# Patient Record
Sex: MALE | Race: WHITE | Employment: FULL TIME | URBAN - METROPOLITAN AREA
[De-identification: names, ages, dates, MRNs, and addresses within clinical notes are randomized per-mention and may not be internally consistent; named-entity substitution may affect disease eponyms.]

---

## 2023-06-20 ENCOUNTER — OFFICE VISIT (OUTPATIENT)
Dept: FAMILY MEDICINE CLINIC | Age: 53
End: 2023-06-20
Payer: COMMERCIAL

## 2023-06-20 VITALS
RESPIRATION RATE: 16 BRPM | WEIGHT: 195 LBS | SYSTOLIC BLOOD PRESSURE: 124 MMHG | DIASTOLIC BLOOD PRESSURE: 76 MMHG | BODY MASS INDEX: 27.92 KG/M2 | HEIGHT: 70 IN | OXYGEN SATURATION: 97 % | HEART RATE: 64 BPM

## 2023-06-20 DIAGNOSIS — Z12.11 SCREENING FOR COLON CANCER: ICD-10-CM

## 2023-06-20 DIAGNOSIS — R19.09 LUMP IN THE GROIN: ICD-10-CM

## 2023-06-20 DIAGNOSIS — E78.00 HYPERCHOLESTEROLEMIA: ICD-10-CM

## 2023-06-20 DIAGNOSIS — Z00.00 PHYSICAL EXAM: Primary | ICD-10-CM

## 2023-06-20 DIAGNOSIS — Z76.89 ENCOUNTER TO ESTABLISH CARE: ICD-10-CM

## 2023-06-20 PROCEDURE — 99386 PREV VISIT NEW AGE 40-64: CPT | Performed by: REGISTERED NURSE

## 2023-06-20 ASSESSMENT — PATIENT HEALTH QUESTIONNAIRE - PHQ9
SUM OF ALL RESPONSES TO PHQ QUESTIONS 1-9: 0
1. LITTLE INTEREST OR PLEASURE IN DOING THINGS: 0
2. FEELING DOWN, DEPRESSED OR HOPELESS: 0
SUM OF ALL RESPONSES TO PHQ QUESTIONS 1-9: 0
SUM OF ALL RESPONSES TO PHQ9 QUESTIONS 1 & 2: 0

## 2023-06-20 ASSESSMENT — ENCOUNTER SYMPTOMS
SHORTNESS OF BREATH: 0
EYES NEGATIVE: 1
BACK PAIN: 0
GASTROINTESTINAL NEGATIVE: 1

## 2023-06-20 NOTE — PROGRESS NOTES
Patient: Nick Bowman is a 50 y.o. male who presents today with the following Chief Complaint(s):  Chief Complaint   Patient presents with    New Patient    Groin Swelling     Bump in Left groin area       Assessment/Plan:    1. Physical exam  Continue healthy nutrition and regular exercise. Obtain copies of labs completed in January- will review results and make any recommendations for follow up. 2. Hypercholesterolemia  See above. 3. Screening for colon cancer  Schedule colonoscopy. - [facility]    4. Lump in the groin  Area in left groin in likely an inguinal hernia or possibly a lipoma. Schedule ultrasound for further evaluation.   - US PELVIS LIMITED; Future    5. Encounter to establish care     Patient's past medical history, surgical history, family history, medications,  and allergies  were all reviewed and updated as appropriate today. Return in about 1 year (around 6/20/2024), or if symptoms worsen or fail to improve. HPI:     He is here to establish care. He recently lost 65lbs. He has a history of high cholesterol. He had blood work drawn through his insurance company in January 2023. He has changed his nutrition and increased exercise to improve his blood pressure and cholesterol. Since he lost weight he noticed a small bump in his left groin that comes and goes. No pain. No current outpatient medications on file. No current facility-administered medications for this visit. Review of Systems   Constitutional: Negative. HENT: Negative. Eyes: Negative. Respiratory:  Negative for shortness of breath. Cardiovascular:  Negative for chest pain, palpitations and leg swelling. Gastrointestinal: Negative. Endocrine: Negative. Genitourinary: Negative. Negative for difficulty urinating, penile swelling and scrotal swelling.         \"Bump in left groin\"   Musculoskeletal:  Negative for arthralgias, back

## 2023-06-22 ENCOUNTER — TELEPHONE (OUTPATIENT)
Dept: FAMILY MEDICINE CLINIC | Age: 53
End: 2023-06-22

## 2023-06-22 DIAGNOSIS — R19.09 LUMP IN THE GROIN: Primary | ICD-10-CM

## 2023-06-22 NOTE — TELEPHONE ENCOUNTER
[address] [place] [address] Scheduling stating the 6/20/23 Ultrasound needs changed because [address] [place] [address] does not do Male Pelvis Ultrasounds. Possible Example can be:  Soft Tissue Non Extremity     Contact name: Mohamud Stager [phone]

## 2023-06-24 ENCOUNTER — HOSPITAL ENCOUNTER (OUTPATIENT)
Dept: ULTRASOUND IMAGING | Age: 53
Discharge: HOME | End: 2023-06-24
Payer: COMMERCIAL

## 2023-06-24 DIAGNOSIS — R19.09 LUMP IN THE GROIN: ICD-10-CM

## 2023-06-24 PROCEDURE — 76999 ECHO EXAMINATION PROCEDURE: CPT

## 2023-06-27 DIAGNOSIS — K40.91 UNILATERAL RECURRENT INGUINAL HERNIA WITHOUT OBSTRUCTION OR GANGRENE: Primary | ICD-10-CM

## 2023-07-19 ENCOUNTER — INITIAL CONSULT (OUTPATIENT)
Dept: SURGERY | Age: 53
End: 2023-07-19
Payer: COMMERCIAL

## 2023-07-19 VITALS
HEART RATE: 64 BPM | BODY MASS INDEX: 28.2 KG/M2 | HEIGHT: 70 IN | WEIGHT: 197 LBS | DIASTOLIC BLOOD PRESSURE: 78 MMHG | SYSTOLIC BLOOD PRESSURE: 130 MMHG

## 2023-07-19 DIAGNOSIS — K40.90 LEFT INGUINAL HERNIA: Primary | ICD-10-CM

## 2023-07-19 PROCEDURE — 99244 OFF/OP CNSLTJ NEW/EST MOD 40: CPT | Performed by: SURGERY

## 2023-07-21 NOTE — PROGRESS NOTES
Department of General Surgery Consult    PATIENT NAME: Delon Lacy   YOB: 1970    ADMISSION DATE: No admission date for patient encounter. TODAY'S DATE: 7/19/23    Reason for Consult:  left inguinal hernia    Chief Complaint: bulge    Historian: patient    Requesting Physician:  Prabhjot Patino    HISTORY OF PRESENT ILLNESS:              The patient is a 50 y.o. male who presents with bulge in left groin. Has had for several months. Noticing more with intended weight  loss. Minor tenderness. No nausea. .    Past Medical History:        Diagnosis Date    High cholesterol        Past Surgical History:        Procedure Laterality Date    BACK SURGERY  01/02/2005       Current Medications:   No current facility-administered medications for this visit. Prior to Admission medications    Not on File        Allergies:  Patient has no known allergies.     Social History:   Social History     Socioeconomic History    Marital status:      Spouse name: Not on file    Number of children: Not on file    Years of education: Not on file    Highest education level: Not on file   Occupational History    Not on file   Tobacco Use    Smoking status: Never    Smokeless tobacco: Former     Types: Chew   Substance and Sexual Activity    Alcohol use: Yes     Comment: occasionally on weekends    Drug use: Never    Sexual activity: Yes     Partners: Female   Other Topics Concern    Not on file   Social History Narrative    Not on file     Social Determinants of Health     Financial Resource Strain: Not on file   Food Insecurity: Not on file   Transportation Needs: Not on file   Physical Activity: Not on file   Stress: Not on file   Social Connections: Not on file   Intimate Partner Violence: Not on file   Housing Stability: Not on file         Family History:        Problem Relation Age of Onset    No Known Problems Mother     High Cholesterol Father        REVIEW OF SYSTEMS:  CONSTITUTIONAL:

## 2023-07-26 ENCOUNTER — TELEPHONE (OUTPATIENT)
Dept: SURGERY | Age: 53
End: 2023-07-26

## 2023-07-26 NOTE — TELEPHONE ENCOUNTER
Spoke with patient and scheduled for 10/13/23 at 7:30 am and an arrival time of 6:00 am with Dr. Maci Conn, [address]na [address] Dr after midnight gaston before surgery - Patient will need  day of surgery - Naty Barrera CNP to complete pre-op exam before surgery - Patient understood and agreed with above noted.

## 2023-07-26 NOTE — TELEPHONE ENCOUNTER
Pt called and would like to schedule inguinal hernia sx with Dr. Denny Mason. He consulted with him on 7/19. He said you gave him a date and time of 10/13 @ 6am. He would like to schedule for then. Please call him and thank you!

## 2023-07-27 NOTE — PROGRESS NOTES
Leopold Prey    Age 50 y.o.    male    1970    MRN [id number]    10/13/2023  Arrival Time_____________  OR Time____________[address]     Procedure(s):  ROBOTIC LEFT INGUINAL HERNIA REPAIR WITH MESH, POSSIBLE BILATERAL                      General   Surgeon(s):  Ashlie Kessler, MD      DAY ADMIT ___  SDS/OP ___  OUTPT IN BED ___        Phone [phone] (home) [phone] (work)    PCP _____________________ [facility]_________________ [facility] Epic CE ( ) Appt ________    ADDITIONAL INFO __________________________________ Cardio/Consult _____________    NOTES _____________________________________________________________________    ____________________________________________________________________________    PAT APPT DATE:________ TIME: ________  FAXED QAD: _______  (__) H&P w/ Hospitalist  __________________________________________________________________________  Preop Nurse phone screen complete: _____________  (__) CBC     (__) W/ DIFF ___________     (__) Hgb A1C    ___________  (__) CHEST X RAY   __________  (__) LIPID PROFILE  ___________  (__) EKG   __________  (__) PT-INR / APTT  ___________  (__) PFT's   __________  (__) BMP   ___________  (__) CAROTIDS  __________  (__) CMP   ___________  (__) VEIN MAPPING  __________  (__) U/A   ___________  (__) HISTORY & PHYSICAL __________  (__) URINE C & S  ___________  (__) CARDIAC CLEARANCE __________  (__) U/A W/ FLEX  ___________  (__) PULM.  CLEARANCE __________  (__) SERUM PREGNANCY ___________  (__) Check Epic DOS orders __________  (__) TYPE & SCREEN __________repeat ( ) (__)  __________________ __________  (__) Albumin / Prealbumin ___________  (__)  __________________ __________  (__) TRANSFERRIN  ___________  (__)  __________________ __________  (__) LIVER PROFILE  ___________  (__)  __________________ __________  (__) MRSA NASAL SWAB ___________  (__) URINE PREG DOS __________  (__) SED RATE  ___________  (__) BLOOD SUGAR DOS __________  (__)

## 2023-09-29 ENCOUNTER — OFFICE VISIT (OUTPATIENT)
Dept: FAMILY MEDICINE CLINIC | Age: 53
End: 2023-09-29
Payer: COMMERCIAL

## 2023-09-29 VITALS
BODY MASS INDEX: 27.92 KG/M2 | RESPIRATION RATE: 16 BRPM | HEIGHT: 70 IN | SYSTOLIC BLOOD PRESSURE: 138 MMHG | WEIGHT: 195 LBS | OXYGEN SATURATION: 94 % | DIASTOLIC BLOOD PRESSURE: 84 MMHG | HEART RATE: 92 BPM

## 2023-09-29 DIAGNOSIS — Z01.818 PREOP EXAMINATION: Primary | ICD-10-CM

## 2023-09-29 DIAGNOSIS — E78.00 HYPERCHOLESTEROLEMIA: ICD-10-CM

## 2023-09-29 DIAGNOSIS — Z13.1 SCREENING FOR DIABETES MELLITUS: ICD-10-CM

## 2023-09-29 DIAGNOSIS — K40.90 UNILATERAL INGUINAL HERNIA WITHOUT OBSTRUCTION OR GANGRENE, RECURRENCE NOT SPECIFIED: ICD-10-CM

## 2023-09-29 DIAGNOSIS — Z01.818 PREOP EXAMINATION: ICD-10-CM

## 2023-09-29 PROCEDURE — 99213 OFFICE O/P EST LOW 20 MIN: CPT | Performed by: REGISTERED NURSE

## 2023-09-30 LAB
ALBUMIN SERPL-MCNC: 4.8 G/DL (ref 3.4–5)
ALBUMIN/GLOB SERPL: 1.8 {RATIO} (ref 1.1–2.2)
ALP SERPL-CCNC: 80 U/L (ref 40–129)
ALT SERPL-CCNC: 19 U/L (ref 10–40)
ANION GAP SERPL CALCULATED.3IONS-SCNC: 11 MMOL/L (ref 3–16)
AST SERPL-CCNC: 17 U/L (ref 15–37)
BASOPHILS # BLD: 0.1 K/UL (ref 0–0.2)
BASOPHILS NFR BLD: 1 %
BILIRUB SERPL-MCNC: 0.3 MG/DL (ref 0–1)
BUN SERPL-MCNC: 15 MG/DL (ref 7–20)
CALCIUM SERPL-MCNC: 9.2 MG/DL (ref 8.3–10.6)
CHLORIDE SERPL-SCNC: 104 MMOL/L (ref 99–110)
CHOLEST SERPL-MCNC: 361 MG/DL (ref 0–199)
CO2 SERPL-SCNC: 25 MMOL/L (ref 21–32)
CREAT SERPL-MCNC: 0.8 MG/DL (ref 0.9–1.3)
DEPRECATED RDW RBC AUTO: 13.1 % (ref 12.4–15.4)
EOSINOPHIL # BLD: 0.4 K/UL (ref 0–0.6)
EOSINOPHIL NFR BLD: 8 %
EST. AVERAGE GLUCOSE BLD GHB EST-MCNC: 99.7 MG/DL
GFR SERPLBLD CREATININE-BSD FMLA CKD-EPI: >60 ML/MIN/{1.73_M2}
GLUCOSE SERPL-MCNC: 89 MG/DL (ref 70–99)
HBA1C MFR BLD: 5.1 %
HCT VFR BLD AUTO: 45.1 % (ref 40.5–52.5)
HDLC SERPL-MCNC: 55 MG/DL (ref 40–60)
HGB BLD-MCNC: 15.3 G/DL (ref 13.5–17.5)
LDLC SERPL CALC-MCNC: 265 MG/DL
LYMPHOCYTES # BLD: 1.7 K/UL (ref 1–5.1)
LYMPHOCYTES NFR BLD: 33 %
MCH RBC QN AUTO: 31.8 PG (ref 26–34)
MCHC RBC AUTO-ENTMCNC: 34 G/DL (ref 31–36)
MCV RBC AUTO: 93.5 FL (ref 80–100)
MONOCYTES # BLD: 0.4 K/UL (ref 0–1.3)
MONOCYTES NFR BLD: 8 %
NEUTROPHILS # BLD: 2.7 K/UL (ref 1.7–7.7)
NEUTROPHILS NFR BLD: 50 %
PLATELET # BLD AUTO: 249 K/UL (ref 135–450)
PLATELET BLD QL SMEAR: ADEQUATE
PMV BLD AUTO: 9.5 FL (ref 5–10.5)
POTASSIUM SERPL-SCNC: 4.6 MMOL/L (ref 3.5–5.1)
PROT SERPL-MCNC: 7.5 G/DL (ref 6.4–8.2)
RBC # BLD AUTO: 4.82 M/UL (ref 4.2–5.9)
RBC MORPH BLD: NORMAL
SLIDE REVIEW: NORMAL
SODIUM SERPL-SCNC: 140 MMOL/L (ref 136–145)
TRIGL SERPL-MCNC: 204 MG/DL (ref 0–150)
VLDLC SERPL CALC-MCNC: 41 MG/DL
WBC # BLD AUTO: 5.3 K/UL (ref 4–11)

## 2023-10-03 ENCOUNTER — PATIENT MESSAGE (OUTPATIENT)
Dept: FAMILY MEDICINE CLINIC | Age: 53
End: 2023-10-03

## 2023-10-03 DIAGNOSIS — E78.00 HYPERCHOLESTEROLEMIA: Primary | ICD-10-CM

## 2023-10-03 RX ORDER — ATORVASTATIN CALCIUM 10 MG/1
10 TABLET, FILM COATED ORAL DAILY
Qty: 90 TABLET | Refills: 1 | Status: SHIPPED | OUTPATIENT
Start: 2023-10-03

## 2023-10-04 DIAGNOSIS — Z91.89 AT INCREASED RISK FOR CARDIOVASCULAR DISEASE: Primary | ICD-10-CM

## 2023-10-04 DIAGNOSIS — E78.00 HYPERCHOLESTEROLEMIA: ICD-10-CM

## 2023-10-04 NOTE — PROGRESS NOTES
Surgery Date and Time: 10/13/23 @ 7:30 am    Arrival Time:  6:00 am    The instructions given when and if a patient needs to stop oral intake prior to surgery varies. Follow the instructions you were given by your surgeon or RN during   Pre-op call. __X__Nothing to eat or to drink after Midnight the night before the surgery. NO gum, mints, candy or ice chips day of surgery. Only take the following medications with a small sip of water the morning of surgery:   NONE      Aspirin, Ibuprofen, Advil, Naproxen, Vitamin E and other Anti-inflammatory products and supplements should be stopped for 5 -7days before surgery or as  directed     by your physician. Do not smoke or vape, and do not drink any alcoholic beverages 24 hours prior to surgery, this includes NA Beer. Refrain from using any recreational drugs,    including non-prescribed prescription drugs. You may brush your teeth and gargle the morning of surgery. DO NOT SWALLOW WATER. You MUST plan for a responsible adult to stay on site while you are here and take you home after your surgery. You will not be allowed to leave alone or drive               yourself home. It is requested someone stay with you the first 24 hrs. Your surgery will be cancelled if you do not have a ride home with a responsible adult. Please wear simple, loose-fitting clothing to the hospital. Saira Endow not bring valuables (money, credit cards, checkbooks, etc.) Do not wear any makeup (including NO eye               makeup) and no nail polish if applicable. DO NOT wear any jewelry or body piercings day of surgery. All body piercing jewelry must be removed. If you have dentures they will be removed before going to the OR; we will provide a container. If you wear contact lenses or glasses, they will be removed; please               bring a case for them or wear glasses day of surgery.               Please see your family

## 2023-10-12 ENCOUNTER — ANESTHESIA EVENT (OUTPATIENT)
Dept: OPERATING ROOM | Age: 53
End: 2023-10-12
Payer: COMMERCIAL

## 2023-10-13 ENCOUNTER — ANESTHESIA (OUTPATIENT)
Dept: OPERATING ROOM | Age: 53
End: 2023-10-13
Payer: COMMERCIAL

## 2023-10-13 ENCOUNTER — HOSPITAL ENCOUNTER (OUTPATIENT)
Age: 53
Setting detail: OUTPATIENT SURGERY
Discharge: HOME | End: 2023-10-13
Attending: SURGERY | Admitting: SURGERY
Payer: COMMERCIAL

## 2023-10-13 VITALS
WEIGHT: 195 LBS | DIASTOLIC BLOOD PRESSURE: 87 MMHG | BODY MASS INDEX: 27.92 KG/M2 | TEMPERATURE: 98.5 F | OXYGEN SATURATION: 91 % | HEART RATE: 72 BPM | SYSTOLIC BLOOD PRESSURE: 125 MMHG | RESPIRATION RATE: 14 BRPM | HEIGHT: 70 IN

## 2023-10-13 DIAGNOSIS — G89.18 POST-OP PAIN: Primary | ICD-10-CM

## 2023-10-13 PROBLEM — K40.90 LEFT INGUINAL HERNIA: Status: ACTIVE | Noted: 2023-10-13

## 2023-10-13 PROCEDURE — 7100000011 HC PHASE II RECOVERY - ADDTL 15 MIN: Performed by: SURGERY

## 2023-10-13 PROCEDURE — 2500000003 HC RX 250 WO HCPCS

## 2023-10-13 PROCEDURE — 6360000002 HC RX W HCPCS: Performed by: SURGERY

## 2023-10-13 PROCEDURE — 3700000000 HC ANESTHESIA ATTENDED CARE: Performed by: SURGERY

## 2023-10-13 PROCEDURE — 3700000001 HC ADD 15 MINUTES (ANESTHESIA): Performed by: SURGERY

## 2023-10-13 PROCEDURE — 2709999900 HC NON-CHARGEABLE SUPPLY: Performed by: SURGERY

## 2023-10-13 PROCEDURE — 2500000003 HC RX 250 WO HCPCS: Performed by: NURSE ANESTHETIST, CERTIFIED REGISTERED

## 2023-10-13 PROCEDURE — C1781 MESH (IMPLANTABLE): HCPCS | Performed by: SURGERY

## 2023-10-13 PROCEDURE — 6370000000 HC RX 637 (ALT 250 FOR IP): Performed by: ANESTHESIOLOGY

## 2023-10-13 PROCEDURE — 3600000019 HC SURGERY ROBOT ADDTL 15MIN: Performed by: SURGERY

## 2023-10-13 PROCEDURE — S2900 ROBOTIC SURGICAL SYSTEM: HCPCS | Performed by: SURGERY

## 2023-10-13 PROCEDURE — 3600000009 HC SURGERY ROBOT BASE: Performed by: SURGERY

## 2023-10-13 PROCEDURE — 7100000000 HC PACU RECOVERY - FIRST 15 MIN: Performed by: SURGERY

## 2023-10-13 PROCEDURE — 2580000003 HC RX 258: Performed by: ANESTHESIOLOGY

## 2023-10-13 PROCEDURE — 7100000010 HC PHASE II RECOVERY - FIRST 15 MIN: Performed by: SURGERY

## 2023-10-13 PROCEDURE — 6360000002 HC RX W HCPCS: Performed by: NURSE ANESTHETIST, CERTIFIED REGISTERED

## 2023-10-13 PROCEDURE — 7100000001 HC PACU RECOVERY - ADDTL 15 MIN: Performed by: SURGERY

## 2023-10-13 DEVICE — MESH CS LEFT LARGE 10CM X 16CM: Type: IMPLANTABLE DEVICE | Site: ABDOMEN | Status: FUNCTIONAL

## 2023-10-13 RX ORDER — SODIUM CHLORIDE 9 MG/ML
INJECTION, SOLUTION INTRAVENOUS PRN
Status: DISCONTINUED | OUTPATIENT
Start: 2023-10-13 | End: 2023-10-13 | Stop reason: HOSPADM

## 2023-10-13 RX ORDER — FENTANYL CITRATE 50 UG/ML
INJECTION, SOLUTION INTRAMUSCULAR; INTRAVENOUS PRN
Status: DISCONTINUED | OUTPATIENT
Start: 2023-10-13 | End: 2023-10-13 | Stop reason: SDUPTHER

## 2023-10-13 RX ORDER — ROCURONIUM BROMIDE 10 MG/ML
INJECTION, SOLUTION INTRAVENOUS PRN
Status: DISCONTINUED | OUTPATIENT
Start: 2023-10-13 | End: 2023-10-13 | Stop reason: SDUPTHER

## 2023-10-13 RX ORDER — IPRATROPIUM BROMIDE AND ALBUTEROL SULFATE 2.5; .5 MG/3ML; MG/3ML
1 SOLUTION RESPIRATORY (INHALATION) ONCE
Status: DISCONTINUED | OUTPATIENT
Start: 2023-10-13 | End: 2023-10-13 | Stop reason: HOSPADM

## 2023-10-13 RX ORDER — OXYCODONE HYDROCHLORIDE 5 MG/1
5 TABLET ORAL EVERY 6 HOURS PRN
Qty: 12 TABLET | Refills: 0 | Status: SHIPPED | OUTPATIENT
Start: 2023-10-13 | End: 2023-10-16

## 2023-10-13 RX ORDER — HYDRALAZINE HYDROCHLORIDE 20 MG/ML
10 INJECTION INTRAMUSCULAR; INTRAVENOUS
Status: DISCONTINUED | OUTPATIENT
Start: 2023-10-13 | End: 2023-10-13 | Stop reason: HOSPADM

## 2023-10-13 RX ORDER — CEFAZOLIN SODIUM IN 0.9 % NACL 2 G/100 ML
2000 PLASTIC BAG, INJECTION (ML) INTRAVENOUS
Status: COMPLETED | OUTPATIENT
Start: 2023-10-13 | End: 2023-10-13

## 2023-10-13 RX ORDER — SODIUM CHLORIDE 0.9 % (FLUSH) 0.9 %
5-40 SYRINGE (ML) INJECTION PRN
Status: DISCONTINUED | OUTPATIENT
Start: 2023-10-13 | End: 2023-10-13 | Stop reason: HOSPADM

## 2023-10-13 RX ORDER — ONDANSETRON 2 MG/ML
4 INJECTION INTRAMUSCULAR; INTRAVENOUS
Status: DISCONTINUED | OUTPATIENT
Start: 2023-10-13 | End: 2023-10-13 | Stop reason: HOSPADM

## 2023-10-13 RX ORDER — HYDROMORPHONE HYDROCHLORIDE 2 MG/ML
INJECTION, SOLUTION INTRAMUSCULAR; INTRAVENOUS; SUBCUTANEOUS PRN
Status: DISCONTINUED | OUTPATIENT
Start: 2023-10-13 | End: 2023-10-13 | Stop reason: SDUPTHER

## 2023-10-13 RX ORDER — METOCLOPRAMIDE HYDROCHLORIDE 5 MG/ML
10 INJECTION INTRAMUSCULAR; INTRAVENOUS
Status: DISCONTINUED | OUTPATIENT
Start: 2023-10-13 | End: 2023-10-13 | Stop reason: HOSPADM

## 2023-10-13 RX ORDER — MIDAZOLAM HYDROCHLORIDE 1 MG/ML
2 INJECTION INTRAMUSCULAR; INTRAVENOUS
Status: DISCONTINUED | OUTPATIENT
Start: 2023-10-13 | End: 2023-10-13 | Stop reason: HOSPADM

## 2023-10-13 RX ORDER — OXYCODONE HYDROCHLORIDE 5 MG/1
5 TABLET ORAL PRN
Status: COMPLETED | OUTPATIENT
Start: 2023-10-13 | End: 2023-10-13

## 2023-10-13 RX ORDER — SCOLOPAMINE TRANSDERMAL SYSTEM 1 MG/1
1 PATCH, EXTENDED RELEASE TRANSDERMAL ONCE
Status: DISCONTINUED | OUTPATIENT
Start: 2023-10-13 | End: 2023-10-13 | Stop reason: HOSPADM

## 2023-10-13 RX ORDER — SODIUM CHLORIDE, SODIUM LACTATE, POTASSIUM CHLORIDE, CALCIUM CHLORIDE 600; 310; 30; 20 MG/100ML; MG/100ML; MG/100ML; MG/100ML
INJECTION, SOLUTION INTRAVENOUS CONTINUOUS
Status: DISCONTINUED | OUTPATIENT
Start: 2023-10-13 | End: 2023-10-13 | Stop reason: HOSPADM

## 2023-10-13 RX ORDER — OXYCODONE HYDROCHLORIDE 5 MG/1
10 TABLET ORAL PRN
Status: COMPLETED | OUTPATIENT
Start: 2023-10-13 | End: 2023-10-13

## 2023-10-13 RX ORDER — BUPIVACAINE HYDROCHLORIDE 5 MG/ML
INJECTION, SOLUTION EPIDURAL; INTRACAUDAL PRN
Status: DISCONTINUED | OUTPATIENT
Start: 2023-10-13 | End: 2023-10-13 | Stop reason: HOSPADM

## 2023-10-13 RX ORDER — SODIUM CHLORIDE 0.9 % (FLUSH) 0.9 %
5-40 SYRINGE (ML) INJECTION EVERY 12 HOURS SCHEDULED
Status: DISCONTINUED | OUTPATIENT
Start: 2023-10-13 | End: 2023-10-13 | Stop reason: HOSPADM

## 2023-10-13 RX ORDER — KETAMINE HYDROCHLORIDE 50 MG/ML
INJECTION, SOLUTION, CONCENTRATE INTRAMUSCULAR; INTRAVENOUS PRN
Status: DISCONTINUED | OUTPATIENT
Start: 2023-10-13 | End: 2023-10-13 | Stop reason: SDUPTHER

## 2023-10-13 RX ORDER — FAMOTIDINE 10 MG/ML
INJECTION, SOLUTION INTRAVENOUS
Status: COMPLETED
Start: 2023-10-13 | End: 2023-10-13

## 2023-10-13 RX ORDER — PROPOFOL 10 MG/ML
INJECTION, EMULSION INTRAVENOUS PRN
Status: DISCONTINUED | OUTPATIENT
Start: 2023-10-13 | End: 2023-10-13 | Stop reason: SDUPTHER

## 2023-10-13 RX ORDER — FAMOTIDINE 10 MG/ML
20 INJECTION, SOLUTION INTRAVENOUS ONCE
Status: COMPLETED | OUTPATIENT
Start: 2023-10-13 | End: 2023-10-13

## 2023-10-13 RX ORDER — LIDOCAINE HYDROCHLORIDE 20 MG/ML
INJECTION, SOLUTION INFILTRATION; PERINEURAL PRN
Status: DISCONTINUED | OUTPATIENT
Start: 2023-10-13 | End: 2023-10-13 | Stop reason: SDUPTHER

## 2023-10-13 RX ORDER — KETOROLAC TROMETHAMINE 30 MG/ML
INJECTION, SOLUTION INTRAMUSCULAR; INTRAVENOUS PRN
Status: DISCONTINUED | OUTPATIENT
Start: 2023-10-13 | End: 2023-10-13 | Stop reason: SDUPTHER

## 2023-10-13 RX ORDER — DIPHENHYDRAMINE HYDROCHLORIDE 50 MG/ML
12.5 INJECTION INTRAMUSCULAR; INTRAVENOUS
Status: DISCONTINUED | OUTPATIENT
Start: 2023-10-13 | End: 2023-10-13 | Stop reason: HOSPADM

## 2023-10-13 RX ORDER — DEXAMETHASONE SODIUM PHOSPHATE 10 MG/ML
INJECTION INTRAMUSCULAR; INTRAVENOUS PRN
Status: DISCONTINUED | OUTPATIENT
Start: 2023-10-13 | End: 2023-10-13 | Stop reason: SDUPTHER

## 2023-10-13 RX ORDER — LIDOCAINE HYDROCHLORIDE 10 MG/ML
1 INJECTION, SOLUTION EPIDURAL; INFILTRATION; INTRACAUDAL; PERINEURAL
Status: DISCONTINUED | OUTPATIENT
Start: 2023-10-13 | End: 2023-10-13 | Stop reason: HOSPADM

## 2023-10-13 RX ORDER — MEPERIDINE HYDROCHLORIDE 50 MG/ML
12.5 INJECTION INTRAMUSCULAR; INTRAVENOUS; SUBCUTANEOUS EVERY 5 MIN PRN
Status: DISCONTINUED | OUTPATIENT
Start: 2023-10-13 | End: 2023-10-13 | Stop reason: HOSPADM

## 2023-10-13 RX ORDER — ONDANSETRON 2 MG/ML
INJECTION INTRAMUSCULAR; INTRAVENOUS PRN
Status: DISCONTINUED | OUTPATIENT
Start: 2023-10-13 | End: 2023-10-13 | Stop reason: SDUPTHER

## 2023-10-13 RX ADMIN — FENTANYL CITRATE 100 MCG: 50 INJECTION, SOLUTION INTRAMUSCULAR; INTRAVENOUS at 07:33

## 2023-10-13 RX ADMIN — LIDOCAINE HYDROCHLORIDE 80 MG: 20 INJECTION, SOLUTION INFILTRATION; PERINEURAL at 07:33

## 2023-10-13 RX ADMIN — ROCURONIUM BROMIDE 20 MG: 50 INJECTION, SOLUTION INTRAVENOUS at 07:59

## 2023-10-13 RX ADMIN — DEXAMETHASONE SODIUM PHOSPHATE 10 MG: 10 INJECTION INTRAMUSCULAR; INTRAVENOUS at 07:33

## 2023-10-13 RX ADMIN — HYDROMORPHONE HYDROCHLORIDE 0.5 MG: 2 INJECTION, SOLUTION INTRAMUSCULAR; INTRAVENOUS; SUBCUTANEOUS at 09:14

## 2023-10-13 RX ADMIN — SUGAMMADEX 200 MG: 100 INJECTION, SOLUTION INTRAVENOUS at 09:14

## 2023-10-13 RX ADMIN — KETOROLAC TROMETHAMINE 30 MG: 30 INJECTION, SOLUTION INTRAMUSCULAR; INTRAVENOUS at 09:09

## 2023-10-13 RX ADMIN — SODIUM CHLORIDE, SODIUM LACTATE, POTASSIUM CHLORIDE, AND CALCIUM CHLORIDE: .6; .31; .03; .02 INJECTION, SOLUTION INTRAVENOUS at 08:40

## 2023-10-13 RX ADMIN — ROCURONIUM BROMIDE 50 MG: 50 INJECTION, SOLUTION INTRAVENOUS at 07:33

## 2023-10-13 RX ADMIN — FAMOTIDINE 20 MG: 10 INJECTION, SOLUTION INTRAVENOUS at 07:17

## 2023-10-13 RX ADMIN — ONDANSETRON 4 MG: 2 INJECTION INTRAMUSCULAR; INTRAVENOUS at 07:33

## 2023-10-13 RX ADMIN — KETAMINE HYDROCHLORIDE 30 MG: 50 INJECTION INTRAMUSCULAR; INTRAVENOUS at 07:38

## 2023-10-13 RX ADMIN — OXYCODONE HYDROCHLORIDE 5 MG: 5 TABLET ORAL at 11:02

## 2023-10-13 RX ADMIN — SODIUM CHLORIDE, SODIUM LACTATE, POTASSIUM CHLORIDE, AND CALCIUM CHLORIDE: .6; .31; .03; .02 INJECTION, SOLUTION INTRAVENOUS at 07:30

## 2023-10-13 RX ADMIN — Medication 2000 MG: at 07:36

## 2023-10-13 RX ADMIN — HYDROMORPHONE HYDROCHLORIDE 0.5 MG: 2 INJECTION, SOLUTION INTRAMUSCULAR; INTRAVENOUS; SUBCUTANEOUS at 09:09

## 2023-10-13 RX ADMIN — PROPOFOL 200 MG: 10 INJECTION, EMULSION INTRAVENOUS at 07:33

## 2023-10-13 ASSESSMENT — PAIN SCALES - GENERAL: PAINLEVEL_OUTOF10: 3

## 2023-10-13 ASSESSMENT — PAIN - FUNCTIONAL ASSESSMENT: PAIN_FUNCTIONAL_ASSESSMENT: 0-10

## 2023-10-13 ASSESSMENT — LIFESTYLE VARIABLES: SMOKING_STATUS: 0

## 2023-10-13 NOTE — OP NOTE
bard mesh was placed intraabdominal and positioned to completely cover the direct, indirect and femoral spaces. The area was irrigated and hemostasis achieved with electrocautery. The peritoneal flap was then closed with running stratafix suture. The robot was undocked and trocars removed. All trocar sites had epidermis closed with 4-0 monocryl. Sterile dressing placed. All suture, sponge and instrument count correct times two at end of case. Transferred to PACU in stable condition. Dr. Dallas Ray was present and scrubbed for the entire case. Electronically signed by Nighat Roman DO on 10/13/2023 at 10:35 AM    I agree with above and was physically present and scrubbed and participated during the entirety of the operation.     Gisella Senior MD

## 2023-10-13 NOTE — DISCHARGE INSTRUCTIONS
[facility] AND [place]    Renate Khan. Loreto Urbano M.D. [address]                [address] M.D. [address] [facility], [address] [facility],[address]         [place], [address] [place] [address] Dr Constantine Montgomery M.D                         [phone]          [facility] Raw[facility] M.D[facility] [address] [place] [address]                                                        POST-OPERATIVE INSTRUCTIONS HERNIA REPAIR    Call the office to schedule your post-operative appointment with your surgeon for two (2) weeks. If you still have bandages over your incisions, you  may remove them in 2-3 days. Place an ice pack over your incisions on and off (15min) at a time for the next 2 days. General guidelines for activity:      Avoid strenuous activity or lifting anything heavier than 15 pounds. It is OK to be up and walking around. Going up and down stairs is   also OK. Do what is comfortable: stop and rest when you feel tired. You will have pain medicine ordered. Take as directed. Do NOT drive for one week and while taking your narcotic pain medicine. Watch for signs of infection:    Excessive warmth or bright redness around your incisions    Leakage of bloody or cloudy fluid from you incisions    Fever over 100.5    If you experience constipation  Increase your water intake. Increase your activity; walking is best.  A stool softener or mild laxative may be necessary if you still have not had a bowel  movement ; call the office for further instructions. Please take note: IF you do not take all of your narcotic pain medication, we ask that you dispose of these responsibly.    Drug drop off boxes are located in the [facility] and [address] [place] [address] emergency

## 2023-10-13 NOTE — H&P
I have reviewed the history and physical and examined the patient. I find no relevant changes. I have reviewed with the patient and/or family members, during the preoperative office visit the risks, benefits, and alternatives to the procedure.     Rashad Oakley MD

## 2023-10-13 NOTE — ANESTHESIA POSTPROCEDURE EVALUATION
Department of Anesthesiology  Postprocedure Note    Patient: Warden Adams  MRN: [id number]  YOB: 1970  Date of evaluation: 10/13/2023      Procedure Summary     Date: 10/13/23 Room / Location: [address] [place] [address] / [address] [place] [address]    Anesthesia Start: 8318 Anesthesia Stop: 8021    Procedure: ROBOTIC LEFT INGUINAL HERNIA REPAIR WITH MESH (Left: Abdomen) Diagnosis:       Inguinal hernia without obstruction or gangrene, recurrence not specified, unspecified laterality      (Inguinal hernia without obstruction or gangrene, recurrence not specified, unspecified laterality [K40.90])    Surgeons: Pool Pichardo MD Responsible Provider: Karrie Jarvis DO    Anesthesia Type: general ASA Status: 1          Anesthesia Type: No value filed.     Zack Phase I: Zack Score: 9    Zack Phase II: Zack Score: 10      Anesthesia Post Evaluation    Patient location during evaluation: PACU  Patient participation: complete - patient participated  Level of consciousness: awake and alert  Pain score: 2  Airway patency: patent  Nausea & Vomiting: no nausea and no vomiting  Complications: no  Cardiovascular status: blood pressure returned to baseline and hemodynamically stable  Respiratory status: acceptable and room air  Hydration status: euvolemic  Pain management: adequate

## 2023-10-13 NOTE — BRIEF OP NOTE
Brief Postoperative Note      Patient: Warden Adams  YOB: 1970  MRN: [id number]    Date of Procedure: 10/13/2023    Pre-Op Diagnosis Codes:     * Inguinal hernia without obstruction or gangrene, recurrence not specified, unspecified laterality [K40.90]    Post-Op Diagnosis: Same       Procedure(s):  ROBOTIC LEFT INGUINAL HERNIA REPAIR WITH MESH    Surgeon(s):  Pool Pichardo MD    Assistant:  Surgical Assistant: Ramiro Fiore    Anesthesia: General    Estimated Blood Loss (mL): Minimal    Complications: None    Specimens:   * No specimens in log *    Implants:  Implant Name Type Inv. Item Serial No.  Lot No. LRB No. Used Action   MESH CS LEFT LARGE 10CM X 16CM - [id number]  MESH CS LEFT LARGE 10CM X 16CM  BARD DAVOL-WD [id number] Left 1 Implanted         Drains: * No LDAs found *    Findings:   Left inguinal hernia repaired with mesh      Electronically signed by Salvador Guerra DO on 10/13/2023 at 9:19 AM    I agree with above and was physically present and scrubbed and participated during the entirety of the operation.     Natasha Ontiveros MD

## 2023-10-13 NOTE — PROGRESS NOTES
Patient admitted to [facility] 4 in preparation for surgery, VSS. Consent confirmed. IV inserted into right and left hands, LR infusing. Belongings on cart to PACU. NPO since 2130. Family at bedside, call light within reach.

## 2023-10-30 ENCOUNTER — OFFICE VISIT (OUTPATIENT)
Dept: SURGERY | Age: 53
End: 2023-10-30

## 2023-10-30 VITALS
BODY MASS INDEX: 28.66 KG/M2 | WEIGHT: 200.2 LBS | DIASTOLIC BLOOD PRESSURE: 88 MMHG | SYSTOLIC BLOOD PRESSURE: 136 MMHG | HEIGHT: 70 IN

## 2023-10-30 DIAGNOSIS — Z98.890 S/P INGUINAL HERNIORRHAPHY: ICD-10-CM

## 2023-10-30 DIAGNOSIS — Z87.19 S/P INGUINAL HERNIORRHAPHY: ICD-10-CM

## 2023-10-30 DIAGNOSIS — K40.90 LEFT INGUINAL HERNIA: Primary | ICD-10-CM

## 2023-10-30 PROCEDURE — 99024 POSTOP FOLLOW-UP VISIT: CPT | Performed by: SURGERY

## 2023-10-30 NOTE — PROGRESS NOTES
HPI: Nursing notes reviewed. Patient is a 51 yo who underwent left inguinal hernia repair (robotic) at [facility].   No nausea. Minimal tenderness. ROS:  10 point review of systems performed with pertinent positives in HPI    Phys:    Abd - soft, minimal tender, nondistended, no hernia   Incisions - no drainage    Assesment: 51 yo s/p robotic left inguinal hernia repair with mesh    Plan: 1. Doing well postop   2.   No heavy lifting another two weeks

## 2024-04-09 SDOH — ECONOMIC STABILITY: FOOD INSECURITY: WITHIN THE PAST 12 MONTHS, THE FOOD YOU BOUGHT JUST DIDN'T LAST AND YOU DIDN'T HAVE MONEY TO GET MORE.: NEVER TRUE

## 2024-04-09 SDOH — ECONOMIC STABILITY: FOOD INSECURITY: WITHIN THE PAST 12 MONTHS, YOU WORRIED THAT YOUR FOOD WOULD RUN OUT BEFORE YOU GOT MONEY TO BUY MORE.: NEVER TRUE

## 2024-04-09 SDOH — ECONOMIC STABILITY: HOUSING INSECURITY
IN THE LAST 12 MONTHS, WAS THERE A TIME WHEN YOU DID NOT HAVE A STEADY PLACE TO SLEEP OR SLEPT IN A SHELTER (INCLUDING NOW)?: NO

## 2024-04-09 SDOH — ECONOMIC STABILITY: INCOME INSECURITY: HOW HARD IS IT FOR YOU TO PAY FOR THE VERY BASICS LIKE FOOD, HOUSING, MEDICAL CARE, AND HEATING?: NOT HARD AT ALL

## 2024-04-09 SDOH — ECONOMIC STABILITY: TRANSPORTATION INSECURITY
IN THE PAST 12 MONTHS, HAS LACK OF TRANSPORTATION KEPT YOU FROM MEETINGS, WORK, OR FROM GETTING THINGS NEEDED FOR DAILY LIVING?: NO

## 2024-04-09 ASSESSMENT — PATIENT HEALTH QUESTIONNAIRE - PHQ9
SUM OF ALL RESPONSES TO PHQ QUESTIONS 1-9: 0
SUM OF ALL RESPONSES TO PHQ9 QUESTIONS 1 & 2: 0
2. FEELING DOWN, DEPRESSED OR HOPELESS: NOT AT ALL
SUM OF ALL RESPONSES TO PHQ QUESTIONS 1-9: 0
SUM OF ALL RESPONSES TO PHQ9 QUESTIONS 1 & 2: 0
SUM OF ALL RESPONSES TO PHQ QUESTIONS 1-9: 0
1. LITTLE INTEREST OR PLEASURE IN DOING THINGS: NOT AT ALL
2. FEELING DOWN, DEPRESSED OR HOPELESS: NOT AT ALL
SUM OF ALL RESPONSES TO PHQ QUESTIONS 1-9: 0
1. LITTLE INTEREST OR PLEASURE IN DOING THINGS: NOT AT ALL

## 2024-04-10 ENCOUNTER — TELEPHONE (OUTPATIENT)
Dept: CARDIOLOGY CLINIC | Age: 54
End: 2024-04-10

## 2024-04-10 ENCOUNTER — OFFICE VISIT (OUTPATIENT)
Dept: FAMILY MEDICINE CLINIC | Age: 54
End: 2024-04-10
Payer: COMMERCIAL

## 2024-04-10 VITALS
DIASTOLIC BLOOD PRESSURE: 78 MMHG | BODY MASS INDEX: 28.7 KG/M2 | SYSTOLIC BLOOD PRESSURE: 118 MMHG | HEART RATE: 73 BPM | OXYGEN SATURATION: 98 % | WEIGHT: 200 LBS

## 2024-04-10 DIAGNOSIS — Z12.5 SCREENING FOR PROSTATE CANCER: ICD-10-CM

## 2024-04-10 DIAGNOSIS — E78.00 HYPERCHOLESTEROLEMIA: ICD-10-CM

## 2024-04-10 DIAGNOSIS — Z00.00 PHYSICAL EXAM: Primary | ICD-10-CM

## 2024-04-10 DIAGNOSIS — Z12.11 SCREENING FOR COLON CANCER: ICD-10-CM

## 2024-04-10 PROCEDURE — 99396 PREV VISIT EST AGE 40-64: CPT | Performed by: REGISTERED NURSE

## 2024-04-10 ASSESSMENT — ENCOUNTER SYMPTOMS
EYES NEGATIVE: 1
RESPIRATORY NEGATIVE: 1
GASTROINTESTINAL NEGATIVE: 1

## 2024-04-10 NOTE — PROGRESS NOTES
Patient: Alexandr Reina is a 53 y.o. male who presents today with the following Chief Complaint(s):  Chief Complaint   Patient presents with    Medication Check    Follow-up       Assessment/Plan:    1. Physical exam  Continue healthy nutrition plan and regular exercise.    2. Hypercholesterolemia  Tolerating atorvastatin well.  Recheck lipid panel today.  - Comprehensive Metabolic Panel; Future  - Lipid Panel; Future    3. Screening for colon cancer  Schedule colonoscopy.  Referral was placed at previous visit.    4. Screening for prostate cancer  Due for PSA for prostate cancer screening.  - PSA, Prostatic Specific Antigen; Future      Return in about 1 year (around 4/10/2025), or if symptoms worsen or fail to improve, for Annual check up.    HPI:     He is here for a physical.  He has a history of hypercholesterolemia and inguinal hernia.    He has been staying active and following a healthy nutrition plan.    Blood pressure stable.    He will schedule colonoscopy.        Current Outpatient Medications   Medication Sig Dispense Refill    atorvastatin (LIPITOR) 10 MG tablet Take 1 tablet by mouth daily (Patient taking differently: Take 1 tablet by mouth daily 10/4/23 - has not started yet) 90 tablet 1     No current facility-administered medications for this visit.       Review of Systems   Constitutional: Negative.    HENT: Negative.     Eyes: Negative.    Respiratory: Negative.     Cardiovascular: Negative.    Gastrointestinal: Negative.    Endocrine: Negative.    Genitourinary: Negative.    Musculoskeletal: Negative.    Skin: Negative.    Neurological: Negative.    Hematological: Negative.    Psychiatric/Behavioral: Negative.         Vitals:    04/10/24 0809   BP: 118/78   Pulse: 73   SpO2: 98%   Weight: 90.7 kg (200 lb)     Physical Exam  Vitals reviewed.   Constitutional:       General: He is not in acute distress.     Appearance: Normal appearance. He is normal weight. He is not ill-appearing,

## 2024-04-11 DIAGNOSIS — E78.00 HYPERCHOLESTEROLEMIA: ICD-10-CM

## 2024-04-11 RX ORDER — ATORVASTATIN CALCIUM 20 MG/1
20 TABLET, FILM COATED ORAL DAILY
Qty: 90 TABLET | Refills: 3 | Status: SHIPPED | OUTPATIENT
Start: 2024-04-11

## 2024-04-24 ENCOUNTER — OFFICE VISIT (OUTPATIENT)
Dept: FAMILY MEDICINE CLINIC | Age: 54
End: 2024-04-24
Payer: COMMERCIAL

## 2024-04-24 VITALS
WEIGHT: 200 LBS | DIASTOLIC BLOOD PRESSURE: 78 MMHG | BODY MASS INDEX: 28.7 KG/M2 | OXYGEN SATURATION: 99 % | HEART RATE: 55 BPM | SYSTOLIC BLOOD PRESSURE: 122 MMHG

## 2024-04-24 DIAGNOSIS — R60.0 EDEMA OF RIGHT FOOT: Primary | ICD-10-CM

## 2024-04-24 PROCEDURE — 99213 OFFICE O/P EST LOW 20 MIN: CPT | Performed by: REGISTERED NURSE

## 2024-04-24 RX ORDER — METHYLPREDNISOLONE 4 MG/1
TABLET ORAL
Qty: 21 TABLET | Refills: 0 | Status: SHIPPED | OUTPATIENT
Start: 2024-04-24

## 2024-04-24 ASSESSMENT — ENCOUNTER SYMPTOMS
SHORTNESS OF BREATH: 0
COLOR CHANGE: 1

## 2024-04-24 NOTE — PROGRESS NOTES
Patient: Alexandr Reina is a 53 y.o. male who presents today with the following Chief Complaint(s):  Chief Complaint   Patient presents with    Foot Swelling       Assessment/Plan:    1. Edema of right foot  Symptoms are consistent with a gout flareup.  Uric acid and CMP ordered.  He had a good response to NSAIDs, plan is Medrol Dosepak.  Additional patient education materials included in AVS.  To follow.  - Uric Acid; Future  - Comprehensive Metabolic Panel; Future  - methylPREDNISolone (MEDROL, JOSE E,) 4 MG tablet; Take by mouth. Take 6 tabs on day 1, 5 tabs on day 2, 4 tabs on day 3, 3 tabs on day 4, 2 tabs on day 5, 1 tab on day 6  Dispense: 21 tablet; Refill: 0      Return if symptoms worsen or fail to improve.    HPI:     He has pain in his right foot for 1 week. Area at the base of his right great tow gets red and is swollen and warm- worse after being on his feet.     Ibuprofen improves pain significantly.     Foot Swelling   The pain is present in the right foot and right toes. This is a new problem. The current episode started in the past 7 days. The problem has been waxing and waning. The pain is severe. Associated symptoms include joint swelling. Pertinent negatives include no fever, inability to bear weight, limited range of motion, numbness, stiffness or tingling. The symptoms are aggravated by activity and standing. He has tried NSAIDS for the symptoms. The treatment provided significant relief.       Current Outpatient Medications   Medication Sig Dispense Refill    methylPREDNISolone (MEDROL, JOSE E,) 4 MG tablet Take by mouth. Take 6 tabs on day 1, 5 tabs on day 2, 4 tabs on day 3, 3 tabs on day 4, 2 tabs on day 5, 1 tab on day 6 21 tablet 0    atorvastatin (LIPITOR) 20 MG tablet Take 1 tablet by mouth daily 90 tablet 3     No current facility-administered medications for this visit.       Review of Systems   Constitutional:  Negative for chills, diaphoresis and fever.   Respiratory:  Negative for

## 2024-07-23 ENCOUNTER — PATIENT MESSAGE (OUTPATIENT)
Dept: FAMILY MEDICINE CLINIC | Age: 54
End: 2024-07-23

## 2024-07-24 RX ORDER — METHYLPREDNISOLONE 4 MG/1
TABLET ORAL
Qty: 1 KIT | Refills: 0 | Status: SHIPPED | OUTPATIENT
Start: 2024-07-24

## 2024-09-27 ENCOUNTER — OFFICE VISIT (OUTPATIENT)
Dept: FAMILY MEDICINE CLINIC | Age: 54
End: 2024-09-27
Payer: COMMERCIAL

## 2024-09-27 ENCOUNTER — HOSPITAL ENCOUNTER (OUTPATIENT)
Age: 54
Discharge: HOME | End: 2024-09-27
Payer: COMMERCIAL

## 2024-09-27 ENCOUNTER — HOSPITAL ENCOUNTER (OUTPATIENT)
Dept: GENERAL RADIOLOGY | Age: 54
Discharge: HOME | End: 2024-09-27
Payer: COMMERCIAL

## 2024-09-27 VITALS
WEIGHT: 201 LBS | HEART RATE: 73 BPM | OXYGEN SATURATION: 98 % | SYSTOLIC BLOOD PRESSURE: 124 MMHG | DIASTOLIC BLOOD PRESSURE: 78 MMHG | BODY MASS INDEX: 28.84 KG/M2

## 2024-09-27 DIAGNOSIS — R60.0 EDEMA OF RIGHT FOOT: ICD-10-CM

## 2024-09-27 DIAGNOSIS — M79.671 RIGHT FOOT PAIN: ICD-10-CM

## 2024-09-27 DIAGNOSIS — M79.671 RIGHT FOOT PAIN: Primary | ICD-10-CM

## 2024-09-27 LAB
ALBUMIN SERPL-MCNC: 4.9 G/DL (ref 3.4–5)
ALBUMIN/GLOB SERPL: 1.8 {RATIO} (ref 1.1–2.2)
ALP SERPL-CCNC: 80 U/L (ref 40–129)
ALT SERPL-CCNC: 32 U/L (ref 10–40)
ANION GAP SERPL CALCULATED.3IONS-SCNC: 12 MMOL/L (ref 3–16)
AST SERPL-CCNC: 23 U/L (ref 15–37)
BILIRUB SERPL-MCNC: 0.3 MG/DL (ref 0–1)
BUN SERPL-MCNC: 18 MG/DL (ref 7–20)
CALCIUM SERPL-MCNC: 10.1 MG/DL (ref 8.3–10.6)
CHLORIDE SERPL-SCNC: 99 MMOL/L (ref 99–110)
CO2 SERPL-SCNC: 24 MMOL/L (ref 21–32)
CREAT SERPL-MCNC: 0.8 MG/DL (ref 0.9–1.3)
ERYTHROCYTE [SEDIMENTATION RATE] IN BLOOD BY WESTERGREN METHOD: 15 MM/HR (ref 0–20)
GFR SERPLBLD CREATININE-BSD FMLA CKD-EPI: >90 ML/MIN/{1.73_M2}
GLUCOSE SERPL-MCNC: 121 MG/DL (ref 70–99)
POTASSIUM SERPL-SCNC: 4.7 MMOL/L (ref 3.5–5.1)
PROT SERPL-MCNC: 7.7 G/DL (ref 6.4–8.2)
RHEUMATOID FACT SER IA-ACNC: <10 IU/ML
SODIUM SERPL-SCNC: 135 MMOL/L (ref 136–145)
URATE SERPL-MCNC: 5.6 MG/DL (ref 3.5–7.2)

## 2024-09-27 PROCEDURE — 73630 X-RAY EXAM OF FOOT: CPT

## 2024-09-27 PROCEDURE — 99214 OFFICE O/P EST MOD 30 MIN: CPT | Performed by: REGISTERED NURSE

## 2024-09-27 RX ORDER — ALLOPURINOL 100 MG/1
100 TABLET ORAL DAILY
Qty: 30 TABLET | Refills: 5 | Status: SHIPPED | OUTPATIENT
Start: 2024-09-27

## 2024-09-27 RX ORDER — METHYLPREDNISOLONE 4 MG
4 TABLET, DOSE PACK ORAL SEE ADMIN INSTRUCTIONS
COMMUNITY

## 2024-09-27 ASSESSMENT — ENCOUNTER SYMPTOMS
COLOR CHANGE: 1
SHORTNESS OF BREATH: 0
GASTROINTESTINAL NEGATIVE: 1
RESPIRATORY NEGATIVE: 1

## 2024-10-15 DIAGNOSIS — M79.673 PAIN OF FOOT, UNSPECIFIED LATERALITY: Primary | ICD-10-CM

## 2024-10-15 RX ORDER — COLCHICINE 0.6 MG/1
TABLET ORAL
Qty: 33 TABLET | Refills: 1 | Status: SHIPPED | OUTPATIENT
Start: 2024-10-15

## 2024-11-21 DIAGNOSIS — M79.673 PAIN OF FOOT, UNSPECIFIED LATERALITY: ICD-10-CM

## 2024-11-21 RX ORDER — COLCHICINE 0.6 MG/1
TABLET ORAL
Qty: 33 TABLET | Refills: 1 | Status: CANCELLED | OUTPATIENT
Start: 2024-11-21

## 2024-11-21 NOTE — TELEPHONE ENCOUNTER
Last Office Visit  -  9/272/4  Next Office Visit  -  n/a    Last Filled  -  10/15/24  Last UDS -    Contract -

## 2024-11-22 DIAGNOSIS — M79.673 PAIN OF FOOT, UNSPECIFIED LATERALITY: ICD-10-CM

## 2024-11-22 RX ORDER — COLCHICINE 0.6 MG/1
0.6 TABLET ORAL DAILY
Qty: 30 TABLET | Refills: 5 | Status: SHIPPED | OUTPATIENT
Start: 2024-11-22

## 2025-04-14 DIAGNOSIS — E78.00 HYPERCHOLESTEROLEMIA: ICD-10-CM

## 2025-04-14 RX ORDER — ATORVASTATIN CALCIUM 20 MG/1
20 TABLET, FILM COATED ORAL DAILY
Qty: 90 TABLET | Refills: 0 | Status: SHIPPED | OUTPATIENT
Start: 2025-04-14 | End: 2025-04-17

## 2025-04-14 NOTE — TELEPHONE ENCOUNTER
Last Office Visit  -  9/27/24  Next Office Visit  -  n/a    Last Filled  -  4/11/24  Last UDS -    Contract -

## 2025-04-14 NOTE — TELEPHONE ENCOUNTER
Pharmacy is requesting a rx for Atorvastatin 20 mg.    Last OV 9/24/24    Next office visit   Visit date not found

## 2025-04-15 ASSESSMENT — PATIENT HEALTH QUESTIONNAIRE - PHQ9
2. FEELING DOWN, DEPRESSED OR HOPELESS: NOT AT ALL
2. FEELING DOWN, DEPRESSED OR HOPELESS: NOT AT ALL
SUM OF ALL RESPONSES TO PHQ QUESTIONS 1-9: 0
SUM OF ALL RESPONSES TO PHQ QUESTIONS 1-9: 0
1. LITTLE INTEREST OR PLEASURE IN DOING THINGS: NOT AT ALL
SUM OF ALL RESPONSES TO PHQ QUESTIONS 1-9: 0
1. LITTLE INTEREST OR PLEASURE IN DOING THINGS: NOT AT ALL
SUM OF ALL RESPONSES TO PHQ9 QUESTIONS 1 & 2: 0
SUM OF ALL RESPONSES TO PHQ QUESTIONS 1-9: 0

## 2025-04-16 ENCOUNTER — OFFICE VISIT (OUTPATIENT)
Dept: FAMILY MEDICINE CLINIC | Age: 55
End: 2025-04-16
Payer: COMMERCIAL

## 2025-04-16 VITALS
HEART RATE: 83 BPM | OXYGEN SATURATION: 96 % | SYSTOLIC BLOOD PRESSURE: 136 MMHG | DIASTOLIC BLOOD PRESSURE: 72 MMHG | BODY MASS INDEX: 30.01 KG/M2 | HEIGHT: 70 IN | WEIGHT: 209.6 LBS

## 2025-04-16 DIAGNOSIS — Z12.5 SCREENING FOR PROSTATE CANCER: ICD-10-CM

## 2025-04-16 DIAGNOSIS — E78.00 HYPERCHOLESTEROLEMIA: ICD-10-CM

## 2025-04-16 DIAGNOSIS — Z00.00 ENCOUNTER FOR WELL ADULT EXAM WITHOUT ABNORMAL FINDINGS: Primary | ICD-10-CM

## 2025-04-16 DIAGNOSIS — Z13.1 SCREENING FOR DIABETES MELLITUS: ICD-10-CM

## 2025-04-16 DIAGNOSIS — M1A.9XX0 CHRONIC GOUT INVOLVING TOE WITHOUT TOPHUS, UNSPECIFIED CAUSE, UNSPECIFIED LATERALITY: ICD-10-CM

## 2025-04-16 DIAGNOSIS — Z12.11 SCREENING FOR COLON CANCER: ICD-10-CM

## 2025-04-16 DIAGNOSIS — Z13.29 SCREENING FOR THYROID DISORDER: ICD-10-CM

## 2025-04-16 LAB
ALBUMIN SERPL-MCNC: 4.5 G/DL (ref 3.4–5)
ALBUMIN/GLOB SERPL: 1.7 {RATIO} (ref 1.1–2.2)
ALP SERPL-CCNC: 86 U/L (ref 40–129)
ALT SERPL-CCNC: 44 U/L (ref 10–40)
ANION GAP SERPL CALCULATED.3IONS-SCNC: 12 MMOL/L (ref 3–16)
AST SERPL-CCNC: 28 U/L (ref 15–37)
BILIRUB SERPL-MCNC: 0.6 MG/DL (ref 0–1)
BUN SERPL-MCNC: 17 MG/DL (ref 7–20)
CALCIUM SERPL-MCNC: 9.8 MG/DL (ref 8.3–10.6)
CHLORIDE SERPL-SCNC: 99 MMOL/L (ref 99–110)
CHOLEST SERPL-MCNC: 257 MG/DL (ref 0–199)
CO2 SERPL-SCNC: 26 MMOL/L (ref 21–32)
CREAT SERPL-MCNC: 0.9 MG/DL (ref 0.9–1.3)
EST. AVERAGE GLUCOSE BLD GHB EST-MCNC: 102.5 MG/DL
GFR SERPLBLD CREATININE-BSD FMLA CKD-EPI: >90 ML/MIN/{1.73_M2}
GLUCOSE SERPL-MCNC: 98 MG/DL (ref 70–99)
HBA1C MFR BLD: 5.2 %
HDLC SERPL-MCNC: 58 MG/DL (ref 40–60)
LDLC SERPL CALC-MCNC: 172 MG/DL
POTASSIUM SERPL-SCNC: 4.6 MMOL/L (ref 3.5–5.1)
PROT SERPL-MCNC: 7.1 G/DL (ref 6.4–8.2)
PSA SERPL DL<=0.01 NG/ML-MCNC: 1.4 NG/ML (ref 0–4)
SODIUM SERPL-SCNC: 137 MMOL/L (ref 136–145)
TRIGL SERPL-MCNC: 133 MG/DL (ref 0–150)
TSH SERPL DL<=0.005 MIU/L-ACNC: 2.93 UIU/ML (ref 0.27–4.2)
VLDLC SERPL CALC-MCNC: 27 MG/DL

## 2025-04-16 PROCEDURE — 99396 PREV VISIT EST AGE 40-64: CPT | Performed by: REGISTERED NURSE

## 2025-04-16 RX ORDER — COLCHICINE 0.6 MG/1
0.6 TABLET ORAL DAILY
Qty: 30 TABLET | Refills: 5 | Status: SHIPPED | OUTPATIENT
Start: 2025-04-16

## 2025-04-16 SDOH — ECONOMIC STABILITY: FOOD INSECURITY: WITHIN THE PAST 12 MONTHS, YOU WORRIED THAT YOUR FOOD WOULD RUN OUT BEFORE YOU GOT MONEY TO BUY MORE.: NEVER TRUE

## 2025-04-16 SDOH — ECONOMIC STABILITY: FOOD INSECURITY: WITHIN THE PAST 12 MONTHS, THE FOOD YOU BOUGHT JUST DIDN'T LAST AND YOU DIDN'T HAVE MONEY TO GET MORE.: NEVER TRUE

## 2025-04-16 ASSESSMENT — ENCOUNTER SYMPTOMS
EYES NEGATIVE: 1
RESPIRATORY NEGATIVE: 1
GASTROINTESTINAL NEGATIVE: 1

## 2025-04-16 NOTE — PROGRESS NOTES
Well Adult Note  Name: Alexandr Reina Today’s Date: 2025   MRN: [id number] Sex: Male   Age: 54 y.o. Ethnicity: Non- / Non    : 1970 Race: White (non-)      Alexandr Reina is here for a well adult exam.          Assessment & Plan  1. Health maintenance.  - Cologuard test will be ordered as an alternative to a colonoscopy due to the patient's busy schedule.  - Declined Tdap, shingles and pneumococcal vaccines at this time.  - Advised to find a new eye doctor for a routine eye exam.  - Blood work will be conducted today.    2. Gout/Toe swelling.  - Overall Gout is well controlled. Symptoms suggest a physical irritation with swelling and redness occurring after prolonged walking or physical activity.  - Currently on colchicine, which appears to be managing the condition well.  - Advised to continue monitoring the condition and report any significant changes.  - Will refer to podiatry if symptoms worsen    Encounter for well adult exam without abnormal findings  VS reviewed     BMI reviewed   All questions answered.   Appropriate healthy lifestyle modifications discussed.  Follow up discussed.    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     Subjective   History of Present Illness  The patient presents for evaluation of toe swelling and health

## 2025-04-16 NOTE — PATIENT INSTRUCTIONS
[facility]- Dr. Meenu Mcallister  [address] [facility], [place] [address] Phone: [phone]     Well Visit, Ages 18 to 65: Care Instructions  Well visits can help you stay healthy. Your doctor has checked your overall health and may have suggested ways to take good care of yourself. Your doctor also may have recommended tests. You can help prevent illness with healthy eating, good sleep, vaccinations, regular exercise, and other steps.    Get the tests that you and your doctor decide on. Depending on your age and risks, examples might include screening for diabetes; hepatitis C; HIV; and cervical, breast, lung, and colon cancer. Screening helps find diseases before any symptoms appear.   Eat healthy foods. Choose fruits, vegetables, whole grains, lean protein, and low-fat dairy foods. Limit saturated fat and reduce salt.     Limit alcohol. Men should have no more than 2 drinks a day. Women should have no more than 1. For some people, no alcohol is the best choice.   Exercise. Get at least 30 minutes of exercise on most days of the week. Walking can be a good choice.     Reach and stay at your healthy weight. This will lower your risk for many health problems.   Take care of your mental health. Try to stay connected with friends, family, and community, and find ways to manage stress.     If you're feeling depressed or hopeless, talk to someone. A counselor can help. If you don't have a counselor, talk to your doctor.   Talk to your doctor if you think you may have a problem with alcohol or drug use. This includes prescription medicines, marijuana, and other drugs.     Avoid tobacco and nicotine: Don't smoke, vape, or chew. If you need help quitting, talk to your doctor.   Practice safer sex. Getting tested, using condoms or dental dams, and limiting sex partners can help prevent STIs.     Use birth control if it's important to you to prevent pregnancy. Talk with your doctor about your choices and what might  No indicators present

## 2025-04-17 ENCOUNTER — RESULTS FOLLOW-UP (OUTPATIENT)
Dept: FAMILY MEDICINE CLINIC | Age: 55
End: 2025-04-17

## 2025-04-17 DIAGNOSIS — E78.00 HYPERCHOLESTEROLEMIA: ICD-10-CM

## 2025-04-17 RX ORDER — ATORVASTATIN CALCIUM 40 MG/1
40 TABLET, FILM COATED ORAL DAILY
Qty: 90 TABLET | Refills: 3 | Status: SHIPPED | OUTPATIENT
Start: 2025-04-17

## 2025-05-01 LAB — NONINV COLON CA DNA+OCC BLD SCRN STL QL: POSITIVE

## (undated) DEVICE — GLOVE,SURG,SENSICARE SLT,LF,PF,7: Brand: MEDLINE

## (undated) DEVICE — SUTURE VCRL + SZ 3-0 L18IN ABSRB UD SH 1/2 CIR TAPERCUT NDL VCP864D

## (undated) DEVICE — SUTURE VCRL + SZ 0 L27IN ABSRB VLT L26MM UR-6 5/8 CIR VCP603H

## (undated) DEVICE — MEGA SUTURECUT ND: Brand: ENDOWRIST

## (undated) DEVICE — SUTURE STRATAFIX SPRL MCRYL + SZ 3 0 L8IN ABSRB UD L26MM SH SXMP1B427

## (undated) DEVICE — Device

## (undated) DEVICE — CANNULA SEAL

## (undated) DEVICE — BLADELESS OBTURATOR: Brand: WECK VISTA

## (undated) DEVICE — COLUMN DRAPE

## (undated) DEVICE — SEAL

## (undated) DEVICE — TIP COVER ACCESSORY

## (undated) DEVICE — AIRSEAL BIFURCATED SMOKE EVAC FILTERED TUBE SET: Brand: AIRSEAL

## (undated) DEVICE — SUTURE VCRL + SZ 2-0 L27IN ABSRB VLT SH 1/2 CIR TAPERPOINT VCP317H

## (undated) DEVICE — SCISSORS SURG DIA8MM MPLR CRV ENDOWRIST

## (undated) DEVICE — REDUCER: Brand: ENDOWRIST

## (undated) DEVICE — ARM DRAPE

## (undated) DEVICE — FENESTRATED BIPOLAR FORCEPS: Brand: ENDOWRIST

## (undated) DEVICE — LIQUIBAND RAPID ADHESIVE 36/CS 0.8ML: Brand: MEDLINE